# Patient Record
Sex: FEMALE | Race: WHITE | NOT HISPANIC OR LATINO
[De-identification: names, ages, dates, MRNs, and addresses within clinical notes are randomized per-mention and may not be internally consistent; named-entity substitution may affect disease eponyms.]

---

## 2021-11-11 PROBLEM — Z00.00 ENCOUNTER FOR PREVENTIVE HEALTH EXAMINATION: Status: ACTIVE | Noted: 2021-11-11

## 2021-12-14 ENCOUNTER — RESULT REVIEW (OUTPATIENT)
Age: 86
End: 2021-12-14

## 2021-12-14 ENCOUNTER — OUTPATIENT (OUTPATIENT)
Dept: OUTPATIENT SERVICES | Facility: HOSPITAL | Age: 86
LOS: 1 days | End: 2021-12-14
Payer: MEDICARE

## 2021-12-14 ENCOUNTER — APPOINTMENT (OUTPATIENT)
Dept: ORTHOPEDIC SURGERY | Facility: CLINIC | Age: 86
End: 2021-12-14
Payer: MEDICARE

## 2021-12-14 DIAGNOSIS — M40.04 POSTURAL KYPHOSIS, THORACIC REGION: ICD-10-CM

## 2021-12-14 DIAGNOSIS — M54.9 DORSALGIA, UNSPECIFIED: ICD-10-CM

## 2021-12-14 DIAGNOSIS — G89.29 DORSALGIA, UNSPECIFIED: ICD-10-CM

## 2021-12-14 PROCEDURE — 72050 X-RAY EXAM NECK SPINE 4/5VWS: CPT | Mod: 26,59

## 2021-12-14 PROCEDURE — 72083 X-RAY EXAM ENTIRE SPI 4/5 VW: CPT

## 2021-12-14 PROCEDURE — 72083 X-RAY EXAM ENTIRE SPI 4/5 VW: CPT | Mod: 26

## 2021-12-14 PROCEDURE — 72050 X-RAY EXAM NECK SPINE 4/5VWS: CPT

## 2021-12-14 PROCEDURE — 72082 X-RAY EXAM ENTIRE SPI 2/3 VW: CPT

## 2021-12-14 PROCEDURE — 99204 OFFICE O/P NEW MOD 45 MIN: CPT

## 2021-12-14 PROCEDURE — 72084 X-RAY EXAM ENTIRE SPI 6/> VW: CPT

## 2021-12-16 VITALS
DIASTOLIC BLOOD PRESSURE: 80 MMHG | HEART RATE: 80 BPM | SYSTOLIC BLOOD PRESSURE: 110 MMHG | OXYGEN SATURATION: 99 % | HEIGHT: 60 IN | BODY MASS INDEX: 21.6 KG/M2 | WEIGHT: 110 LBS

## 2021-12-20 PROBLEM — M40.04 POSTURAL KYPHOSIS OF THORACIC REGION: Status: ACTIVE | Noted: 2021-12-20

## 2021-12-21 NOTE — PHYSICAL EXAM
[de-identified] : Physical Exam:\par \par General: patient is well developed, well nourished, in no acute \par distress, alert and oriented x 3. \par \par Mood and affect: normal\par \par Respiratory: no respiratory distress noted\par \par Skin: no scars over spine, skin intact, no erythema, increased warmth\par \par Alignment: forward sagittal alignment; The spine is well compensated in the coronal plane.\par \par Gait: The patient is able to toe walk and heel walk without difficulty.\par \par Palpation: no tenderness to palpation midline along spine or paraspinal region\par \par Range of motion: Cervical and Lumbar spine ROM is full\par \par Neurologic Exam:\par Motor: Manual Muscle testing in the upper and lower extremities is 5 out of 5 in all muscle groups. There is no evidence of muscular atrophy in the upper extremities. Sensory: Sensation to light touch is grossly intact in the upper and lower extremities\par \par Reflexes: DTR are present and symmetric throughout\par \par Special tests: Spurlings sign absent. Lhermitte's sign is absent. Straight Leg Raise Negative, Cross Straight Leg Raise Negative, KEIRY Test Negative\par \par Hip Exam: Full painless ROM of bilateral hips\par \par Vascular: Examination of the peripheral vascular system demonstrates no evidence of congestion or edema. no lymphedema bilateral lower extremities, pulses are present and symmetric in both lower extremities.\par \par \par  [de-identified] : XR 12/14/21: multilevel degenerative changes, exaggerated thoracic kyphosis, no fractures

## 2021-12-21 NOTE — REASON FOR VISIT
[Initial Consultation] : an initial consultation for [Back Pain] : back pain [Patient Declined  Services] : - None: Patient declined  services [[email or web address]] : Granddaughter present for exam and translating for patient when needed

## 2021-12-21 NOTE — DISCUSSION/SUMMARY
[de-identified] : Discussed my findings with the patient and her granddaughter. I am recommending an MRI thoracic without contrast, order given. She will follow up with me after imaging is completed, sooner if there is an issue. Follow up ok for telehealth. All questions answered.

## 2021-12-21 NOTE — HISTORY OF PRESENT ILLNESS
[de-identified] : Ms. EDEN is a very pleasant 86 year old female who complains of mid back pain for approximately 3 years, worse over the past 2 years, atraumatic. Pain constant, interferes with daily activities. Reports difficulty standing up straight, unchanged for years.. Denies radiation of pain. Denies extremity numbness/weakness/paresthesias. Has been taking tramadol 50mg bid (rx given by pcp) for the past year with relief. Has seen pain management for injections, last injection about 1 year ago. \par \par The patient no history of previous spine surgery.\par \par The patient has no history of unexpected weight loss, no history of active cancer, no history bladder or bowel dysfunction, no night pain, no fevers or chills.\par \par The past medical history, surgical history, family history, allergies, medications, 10+ point review of systems, family history and social history were reviewed and non contributory.\par \par

## 2022-02-01 ENCOUNTER — APPOINTMENT (OUTPATIENT)
Dept: ORTHOPEDIC SURGERY | Facility: CLINIC | Age: 87
End: 2022-02-01